# Patient Record
Sex: FEMALE | Race: WHITE | NOT HISPANIC OR LATINO | Employment: UNEMPLOYED | URBAN - METROPOLITAN AREA
[De-identification: names, ages, dates, MRNs, and addresses within clinical notes are randomized per-mention and may not be internally consistent; named-entity substitution may affect disease eponyms.]

---

## 2019-09-09 ENCOUNTER — HOSPITAL ENCOUNTER (EMERGENCY)
Facility: HOSPITAL | Age: 44
Discharge: HOME | End: 2019-09-10
Attending: EMERGENCY MEDICINE
Payer: MEDICAID

## 2019-09-09 VITALS
TEMPERATURE: 98 F | OXYGEN SATURATION: 100 % | HEART RATE: 68 BPM | DIASTOLIC BLOOD PRESSURE: 78 MMHG | SYSTOLIC BLOOD PRESSURE: 153 MMHG | BODY MASS INDEX: 26.29 KG/M2 | WEIGHT: 139.25 LBS | HEIGHT: 61 IN | RESPIRATION RATE: 19 BRPM

## 2019-09-09 DIAGNOSIS — L03.221 CELLULITIS OF NECK: Primary | ICD-10-CM

## 2019-09-09 LAB
ALBUMIN SERPL BCP-MCNC: 3.5 G/DL (ref 3.5–5.2)
ALP SERPL-CCNC: 89 U/L (ref 55–135)
ALT SERPL W/O P-5'-P-CCNC: 61 U/L (ref 10–44)
ANION GAP SERPL CALC-SCNC: 10 MMOL/L (ref 8–16)
AST SERPL-CCNC: 66 U/L (ref 10–40)
B-HCG UR QL: NEGATIVE
BASOPHILS # BLD AUTO: 0.02 K/UL (ref 0–0.2)
BASOPHILS NFR BLD: 0.2 % (ref 0–1.9)
BILIRUB SERPL-MCNC: 0.4 MG/DL (ref 0.1–1)
BILIRUB UR QL STRIP: NEGATIVE
BUN SERPL-MCNC: 10 MG/DL (ref 6–20)
CALCIUM SERPL-MCNC: 8.6 MG/DL (ref 8.7–10.5)
CHLORIDE SERPL-SCNC: 110 MMOL/L (ref 95–110)
CLARITY UR: CLEAR
CO2 SERPL-SCNC: 22 MMOL/L (ref 23–29)
COLOR UR: YELLOW
CREAT SERPL-MCNC: 0.8 MG/DL (ref 0.5–1.4)
DIFFERENTIAL METHOD: NORMAL
EOSINOPHIL # BLD AUTO: 0.1 K/UL (ref 0–0.5)
EOSINOPHIL NFR BLD: 1.4 % (ref 0–8)
ERYTHROCYTE [DISTWIDTH] IN BLOOD BY AUTOMATED COUNT: 13.2 % (ref 11.5–14.5)
EST. GFR  (AFRICAN AMERICAN): >60 ML/MIN/1.73 M^2
EST. GFR  (NON AFRICAN AMERICAN): >60 ML/MIN/1.73 M^2
GLUCOSE SERPL-MCNC: 83 MG/DL (ref 70–110)
GLUCOSE UR QL STRIP: NEGATIVE
HCT VFR BLD AUTO: 38.6 % (ref 37–48.5)
HGB BLD-MCNC: 13 G/DL (ref 12–16)
HGB UR QL STRIP: ABNORMAL
KETONES UR QL STRIP: NEGATIVE
LEUKOCYTE ESTERASE UR QL STRIP: NEGATIVE
LYMPHOCYTES # BLD AUTO: 2.3 K/UL (ref 1–4.8)
LYMPHOCYTES NFR BLD: 22.6 % (ref 18–48)
MCH RBC QN AUTO: 30.4 PG (ref 27–31)
MCHC RBC AUTO-ENTMCNC: 33.7 G/DL (ref 32–36)
MCV RBC AUTO: 90 FL (ref 82–98)
MONOCYTES # BLD AUTO: 0.7 K/UL (ref 0.3–1)
MONOCYTES NFR BLD: 7.2 % (ref 4–15)
NEUTROPHILS # BLD AUTO: 6.9 K/UL (ref 1.8–7.7)
NEUTROPHILS NFR BLD: 68.7 % (ref 38–73)
NITRITE UR QL STRIP: NEGATIVE
PH UR STRIP: 6 [PH] (ref 5–8)
PLATELET # BLD AUTO: 323 K/UL (ref 150–350)
PMV BLD AUTO: 10.1 FL (ref 9.2–12.9)
POTASSIUM SERPL-SCNC: 4.2 MMOL/L (ref 3.5–5.1)
PROT SERPL-MCNC: 6.8 G/DL (ref 6–8.4)
PROT UR QL STRIP: NEGATIVE
RBC # BLD AUTO: 4.27 M/UL (ref 4–5.4)
SODIUM SERPL-SCNC: 142 MMOL/L (ref 136–145)
SP GR UR STRIP: 1.02 (ref 1–1.03)
URN SPEC COLLECT METH UR: ABNORMAL
UROBILINOGEN UR STRIP-ACNC: NEGATIVE EU/DL
WBC # BLD AUTO: 10.12 K/UL (ref 3.9–12.7)

## 2019-09-09 PROCEDURE — 80053 COMPREHEN METABOLIC PANEL: CPT

## 2019-09-09 PROCEDURE — 87040 BLOOD CULTURE FOR BACTERIA: CPT

## 2019-09-09 PROCEDURE — 85025 COMPLETE CBC W/AUTO DIFF WBC: CPT

## 2019-09-09 PROCEDURE — 25000003 PHARM REV CODE 250: Performed by: EMERGENCY MEDICINE

## 2019-09-09 PROCEDURE — 81003 URINALYSIS AUTO W/O SCOPE: CPT

## 2019-09-09 PROCEDURE — 63600175 PHARM REV CODE 636 W HCPCS: Performed by: EMERGENCY MEDICINE

## 2019-09-09 PROCEDURE — 96374 THER/PROPH/DIAG INJ IV PUSH: CPT | Mod: 59

## 2019-09-09 PROCEDURE — 25500020 PHARM REV CODE 255: Performed by: EMERGENCY MEDICINE

## 2019-09-09 PROCEDURE — 99285 EMERGENCY DEPT VISIT HI MDM: CPT | Mod: 25

## 2019-09-09 PROCEDURE — 81025 URINE PREGNANCY TEST: CPT

## 2019-09-09 RX ORDER — LISINOPRIL 10 MG/1
10 TABLET ORAL
COMMUNITY
Start: 2018-10-11

## 2019-09-09 RX ORDER — RIFAMPIN 300 MG/1
300 CAPSULE ORAL EVERY 12 HOURS
Qty: 14 CAPSULE | Refills: 0 | Status: SHIPPED | OUTPATIENT
Start: 2019-09-09 | End: 2019-09-16

## 2019-09-09 RX ORDER — RIZATRIPTAN BENZOATE 10 MG/1
TABLET, ORALLY DISINTEGRATING ORAL
COMMUNITY
Start: 2018-11-01

## 2019-09-09 RX ORDER — HYDROCODONE BITARTRATE AND ACETAMINOPHEN 5; 325 MG/1; MG/1
1 TABLET ORAL EVERY 6 HOURS PRN
Qty: 9 TABLET | Refills: 0 | Status: SHIPPED | OUTPATIENT
Start: 2019-09-09

## 2019-09-09 RX ORDER — PANTOPRAZOLE SODIUM 40 MG/1
TABLET, DELAYED RELEASE ORAL
COMMUNITY
Start: 2017-11-02

## 2019-09-09 RX ORDER — LORAZEPAM 1 MG/1
TABLET ORAL
COMMUNITY

## 2019-09-09 RX ORDER — KETOROLAC TROMETHAMINE 30 MG/ML
30 INJECTION, SOLUTION INTRAMUSCULAR; INTRAVENOUS
Status: COMPLETED | OUTPATIENT
Start: 2019-09-09 | End: 2019-09-09

## 2019-09-09 RX ORDER — VANCOMYCIN HCL IN 5 % DEXTROSE 1G/250ML
1000 PLASTIC BAG, INJECTION (ML) INTRAVENOUS
Status: DISCONTINUED | OUTPATIENT
Start: 2019-09-09 | End: 2019-09-09

## 2019-09-09 RX ORDER — LIDOCAINE HYDROCHLORIDE 10 MG/ML
10 INJECTION, SOLUTION EPIDURAL; INFILTRATION; INTRACAUDAL; PERINEURAL
Status: COMPLETED | OUTPATIENT
Start: 2019-09-09 | End: 2019-09-09

## 2019-09-09 RX ADMIN — KETOROLAC TROMETHAMINE 30 MG: 30 INJECTION, SOLUTION INTRAMUSCULAR at 09:09

## 2019-09-09 RX ADMIN — IOHEXOL 75 ML: 350 INJECTION, SOLUTION INTRAVENOUS at 08:09

## 2019-09-09 RX ADMIN — LIDOCAINE HYDROCHLORIDE 100 MG: 10 INJECTION, SOLUTION EPIDURAL; INFILTRATION; INTRACAUDAL; PERINEURAL at 09:09

## 2019-09-09 NOTE — ED PROVIDER NOTES
"43 year old female that [facility] on abscess to neck      Pt understands that a workup will begin in the treatment lounge/results waiting areas due to there being no available beds. Pt also undertstands they will be placed in the next available bed where they will be seen and dispositioned by a physician. I am removing myself from the care of pt. Pt will be assigned to next available physician.      Derrek Swanpiyush 1837      SCRIBE #1 NOTE: I, Karthik Harrington, am scribing for, and in the presence of, Jose Quinones Jr., MD. I have scribed the entire note.       History     Chief Complaint   Patient presents with    Abscess     Pt states, "I have an abscess on my face." [facility] went to [facility] and was referred to the ER.     Review of patient's allergies indicates:   Allergen Reactions    Pcn [penicillins] Anaphylaxis and Hives    Bactrim [sulfamethoxazole-trimethoprim]     Betadine [povidone-iodine]     Keflex [cephalexin]     Sulfa (sulfonamide antibiotics)          History of Present Illness     HPI    9/9/2019, 9:13 PM  History obtained from the patient      History of Present Illness: Pattie Cherry is a 43 y.o. female patient who presents to the Emergency Department for evaluation of a draining abscess to the right neck s/p I&D which onset gradually several days ago. Pt states that the abscess was lanced at the [facility] on 9/6 and was referred from urgent care today for further evaluation. Symptoms are constant and moderate in severity. No mitigating or exacerbating factors reported. No associated sxs reported. Patient denies any increased warmth/erythema to the neck, fever, chills, n/v/d, abd pain, SOB, voice change, trouble swallowing, and all other sxs at this time. Prior Tx includes clindamycin without relief. No further complaints or concerns at this time.         Arrival mode: Personal vehicle    PCP: Jason Vo MD        Past Medical " History:  Past Medical History:   Diagnosis Date    Bipolar 1 disorder     Diabetes mellitus     Disc disorder of cervical region     Disc disorder of lumbar region     GERD (gastroesophageal reflux disease)     Hepatitis C     Hiatal hernia     IBS (irritable bowel syndrome)     Knee pain, chronic     Liver disease     Narcolepsy        Past Surgical History:  Past Surgical History:   Procedure Laterality Date    BREAST SURGERY       SECTION, CLASSIC      x3    KNEE SURGERY      TONSILLECTOMY, ADENOIDECTOMY           Family History:  Family History   Problem Relation Age of Onset    Hypertension Unknown        Social History:  Social History     Tobacco Use    Smoking status: Current Every Day Smoker     Packs/day: 0.50    Smokeless tobacco: Never Used   Substance and Sexual Activity    Alcohol use: No    Drug use: No     Comment: former IV drug user    Sexual activity: Not Currently        Review of Systems     Review of Systems   Constitutional: Negative for chills and fever.   HENT: Negative for sore throat, trouble swallowing and voice change.    Respiratory: Negative for shortness of breath.    Cardiovascular: Negative for chest pain.   Gastrointestinal: Negative for abdominal pain, diarrhea, nausea and vomiting.   Genitourinary: Negative for dysuria.   Musculoskeletal: Negative for back pain.   Skin: Positive for wound (abscess, right neck, draining). Negative for rash.        - increased warmth/erythema to the neck   Neurological: Negative for weakness.   Hematological: Does not bruise/bleed easily.   All other systems reviewed and are negative.       Physical Exam     Initial Vitals [19 1833]   BP Pulse Resp Temp SpO2   (!) 151/81 86 20 98 °F (36.7 °C) 100 %      MAP       --          Physical Exam  Nursing Notes and Vital Signs Reviewed.  Constitutional: Patient is in no acute distress. Well-developed and well-nourished.  Head: Atraumatic. Normocephalic.  Eyes: PERRL. EOM  "intact. Conjunctivae are not pale. No scleral icterus.  ENT: Mucous membranes are moist. Oropharynx is clear and symmetric.    Neck: Supple. Full ROM. No lymphadenopathy.  Cardiovascular: Regular rate. Regular rhythm. No murmurs, rubs, or gallops. Distal pulses are 2+ and symmetric.  Pulmonary/Chest: No respiratory distress. Clear to auscultation bilaterally. No wheezing or rales.  Abdominal: Soft and non-distended.  There is no tenderness.  No rebound, guarding, or rigidity. Good bowel sounds.  Genitourinary: No CVA tenderness  Musculoskeletal: Moves all extremities. No obvious deformities. No edema. No calf tenderness.  Skin: Warm and dry. There is an abscess to the right neck just below the angle of the mandible.  There is in open sinus with some induration.  There is no fluctuance.  This is superficial with mild overlying erythema.  No current drainage. Is no airway compromise.  Trachea is midline. No stridor.  Neurological:  Alert, awake, and appropriate.  Normal speech.  No acute focal neurological deficits are appreciated.  Psychiatric: Normal affect. Good eye contact. Appropriate in content.     ED Course   Procedures  ED Vital Signs:  Vitals:    09/09/19 1833 09/09/19 2106 09/09/19 2130 09/09/19 2330   BP: (!) 151/81 (!) 137/104 135/61 (!) 153/78   Pulse: 86 81 78 68   Resp: 20 17 20 19   Temp: 98 °F (36.7 °C)  98.1 °F (36.7 °C)    TempSrc: Oral  Oral    SpO2: 100% 100% 100% 100%   Weight: 63.2 kg (139 lb 3.5 oz)      Height: 5' 1" (1.549 m)          Abnormal Lab Results:  Labs Reviewed   COMPREHENSIVE METABOLIC PANEL - Abnormal; Notable for the following components:       Result Value    CO2 22 (*)     Calcium 8.6 (*)     AST 66 (*)     ALT 61 (*)     All other components within normal limits   URINALYSIS, REFLEX TO URINE CULTURE - Abnormal; Notable for the following components:    Occult Blood UA Trace (*)     All other components within normal limits    Narrative:     Preferred Collection Type->Urine, " Clean Catch   CULTURE, BLOOD   CULTURE, BLOOD   CBC W/ AUTO DIFFERENTIAL   PREGNANCY TEST, URINE RAPID        All Lab Results:  Results for orders placed or performed during the hospital encounter of 09/09/19   CBC auto differential   Result Value Ref Range    WBC 10.12 3.90 - 12.70 K/uL    RBC 4.27 4.00 - 5.40 M/uL    Hemoglobin 13.0 12.0 - 16.0 g/dL    Hematocrit 38.6 37.0 - 48.5 %    Mean Corpuscular Volume 90 82 - 98 fL    Mean Corpuscular Hemoglobin 30.4 27.0 - 31.0 pg    Mean Corpuscular Hemoglobin Conc 33.7 32.0 - 36.0 g/dL    RDW 13.2 11.5 - 14.5 %    Platelets 323 150 - 350 K/uL    MPV 10.1 9.2 - 12.9 fL    Gran # (ANC) 6.9 1.8 - 7.7 K/uL    Lymph # 2.3 1.0 - 4.8 K/uL    Mono # 0.7 0.3 - 1.0 K/uL    Eos # 0.1 0.0 - 0.5 K/uL    Baso # 0.02 0.00 - 0.20 K/uL    Gran% 68.7 38.0 - 73.0 %    Lymph% 22.6 18.0 - 48.0 %    Mono% 7.2 4.0 - 15.0 %    Eosinophil% 1.4 0.0 - 8.0 %    Basophil% 0.2 0.0 - 1.9 %    Differential Method Automated    Comprehensive metabolic panel   Result Value Ref Range    Sodium 142 136 - 145 mmol/L    Potassium 4.2 3.5 - 5.1 mmol/L    Chloride 110 95 - 110 mmol/L    CO2 22 (L) 23 - 29 mmol/L    Glucose 83 70 - 110 mg/dL    BUN, Bld 10 6 - 20 mg/dL    Creatinine 0.8 0.5 - 1.4 mg/dL    Calcium 8.6 (L) 8.7 - 10.5 mg/dL    Total Protein 6.8 6.0 - 8.4 g/dL    Albumin 3.5 3.5 - 5.2 g/dL    Total Bilirubin 0.4 0.1 - 1.0 mg/dL    Alkaline Phosphatase 89 55 - 135 U/L    AST 66 (H) 10 - 40 U/L    ALT 61 (H) 10 - 44 U/L    Anion Gap 10 8 - 16 mmol/L    eGFR if African American >60 >60 mL/min/1.73 m^2    eGFR if non African American >60 >60 mL/min/1.73 m^2   Rapid Pregnancy, Urine   Result Value Ref Range    Preg Test, Ur Negative    Urinalysis, Reflex to Urine Culture Urine, Clean Catch   Result Value Ref Range    Specimen UA Urine, Clean Catch     Color, UA Yellow Yellow, Straw, Janneth    Appearance, UA Clear Clear    pH, UA 6.0 5.0 - 8.0    Specific Gravity, UA 1.020 1.005 - 1.030    Protein, UA  Negative Negative    Glucose, UA Negative Negative    Ketones, UA Negative Negative    Bilirubin (UA) Negative Negative    Occult Blood UA Trace (A) Negative    Nitrite, UA Negative Negative    Urobilinogen, UA Negative <2.0 EU/dL    Leukocytes, UA Negative Negative         Imaging Results:  Imaging Results          CT Soft Tissue Neck With Contrast (Final result)  Result time 09/09/19 21:06:21    Final result by Luis E Melo MD (09/09/19 21:06:21)                 Impression:      Focal skin defect right lateral mid face.  Adjacent skin thickening and infiltration of the subcutaneous fat.  Poorly circumscribed 8 mm collection.  Probable cellulitis with early phlegmon.  No discrete or drainable fluid collection/abscess seen at this time.      Electronically signed by: Luis E Melo MD  Date:    09/09/2019  Time:    21:06             Narrative:    EXAMINATION:  CT SOFT TISSUE NECK WITH CONTRAST    CLINICAL HISTORY:  Neck abscess.    TECHNIQUE:  CT scan of the neck soft tissues performed with 75 cc Omnipaque 350. all CT scans at this facility are performed  using dose modulation techniques as appropriate to performed exam including the following:  automated exposure control; adjustment of mA and/or kV according to the patients size (this includes techniques or standardized protocols for targeted exams where dose is matched to indication/reason for exam: i.e. extremities or head);  iterative reconstruction technique.    COMPARISON:  None    FINDINGS:  There is a focal skin defect in the lateral right mid face just lateral to the right mandibular angle measuring up to 1.1 cm in size.  There is adjacent skin thickening.  Adjacent infiltration of the subcutaneous fat is present.  Thickening of the subjacent platysma muscle is noted as well.    In addition there is a poorly circumscribed collection involving the skin approximately 8 mm in size.  Possible early phlegmon.  A discrete drainable collection or  abscess is not seen at this time.    The parotid gland appears normal.  The submandibular gland appears normal.    No significant lymphadenopathy is seen.    The vascular structures are normal in configuration.    There are postoperative changes of the cervical spine                                        The Emergency Provider reviewed the vital signs and test results, which are outlined above.     ED Discussion       9:30 PM: Reassessed pt at this time.  Pt states her condition has improved at this time. Discussed with pt all pertinent ED information and results. Discussed pt dx and plan of tx. Gave pt all f/u and return to the ED instructions. All questions and concerns were addressed at this time. Pt expresses understanding of information and instructions, and is comfortable with plan to discharge. Pt is stable for discharge.    I discussed with patient and/or family/caretaker that evaluation in the ED does not suggest any emergent or life threatening medical conditions requiring immediate intervention beyond what was provided in the ED, and I believe patient is safe for discharge.  Regardless, an unremarkable evaluation in the ED does not preclude the development or presence of a serious of life threatening condition. As such, patient was instructed to return immediately for any worsening or change in current symptoms.    I discussed wound care precautions with patient and/or family/caretaker; specifically that all wounds have risk of infection despite efforts to cleanse and debride the wound; and there is a risk of an occult foreign body (and thus increased risk of infection) despite a negative examination.  I discussed with patient need to return for any signs of infection, specifically redness, increased pain, fever, drainage of pus, or any concern, immediately.    I counseled the patient on the risks of taking antibiotics, including taking all doses as prescribed. I explained the risk of antibiotic  resistance in the future and susceptibility to C. diff infection, severe allergic reactions, and yeast infections.     9:44 PM  The neck wounds are open and draining.  There is no fluid collection on CT scan but simply a phlegmon.  The patient is currently on clindamycin and is not allergic unlike her initial triage allergies.  In light of this improvement, there is nothing the drain at this point.  I will add rifampin for 2nd antibiotic and treat her pain for close follow-up with primary care doctor.  There is no fasciitis or subcu air.  The wounds are open and draining.  I have advised warm compresses 4 times daily.  This should heal in a few days.  Patient verbalized understanding agreement with all instructions and seems reliable.       Medical Decision Making:   Clinical Tests:   Lab Tests: Ordered and Reviewed  Radiological Study: Reviewed and Ordered           ED Medication(s):  Medications   lidocaine (PF) 10 mg/ml (1%) injection 100 mg (100 mg Infiltration Given 9/9/19 2102)   ketorolac injection 30 mg (30 mg Intravenous Given 9/9/19 2102)   iohexol (OMNIPAQUE 350) injection 100 mL (75 mLs Intravenous Given 9/9/19 2057)       New Prescriptions    HYDROCODONE-ACETAMINOPHEN (NORCO) 5-325 MG PER TABLET    Take 1 tablet by mouth every 6 (six) hours as needed for Pain.    RIFAMPIN (RIFADIN) 300 MG CAPSULE    Take 1 capsule (300 mg total) by mouth every 12 (twelve) hours. for 7 days        Medication List      START taking these medications    HYDROcodone-acetaminophen 5-325 mg per tablet  Commonly known as:  NORCO  Take 1 tablet by mouth every 6 (six) hours as needed for Pain.     rifAMpin 300 MG capsule  Commonly known as:  RIFADIN  Take 1 capsule (300 mg total) by mouth every 12 (twelve) hours. for 7 days        ASK your doctor about these medications    ATIVAN 1 MG tablet  Generic drug:  LORazepam     busPIRone 15 MG tablet  Commonly known as:  BUSPAR     furosemide 20 MG tablet  Commonly known as:  LASIX      gabapentin 600 MG tablet  Commonly known as:  NEURONTIN     hydrOXYzine HCl 25 MG tablet  Commonly known as:  ATARAX  Take 1 tablet (25 mg total) by mouth every 6 (six) hours.     lisinopril 10 MG tablet     methocarbamol 750 MG Tab  Commonly known as:  ROBAXIN  Take 1 tablet (750 mg total) by mouth 2 (two) times daily as needed (pain and muscle soreness).     paroxetine 30 MG tablet  Commonly known as:  PAXIL     PROTONIX 40 MG tablet  Generic drug:  pantoprazole     rizatriptan 10 MG disintegrating tablet  Commonly known as:  MAXALT-MLT     rOPINIRole 1 MG tablet  Commonly known as:  REQUIP     traMADol 50 mg tablet  Commonly known as:  ULTRAM           Where to Get Your Medications      You can get these medications from any pharmacy    Bring a paper prescription for each of these medications  · HYDROcodone-acetaminophen 5-325 mg per tablet  · rifAMpin 300 MG capsule         Follow-up Information     Jason Vo MD In 1 day.    Specialty:  Nuclear Medicine  Contact information:  [address] [place] [address]  [phone]                       Scribe Attestation:   Scribe #1: I performed the above scribed service and the documentation accurately describes the services I performed. I attest to the accuracy of the note.     Attending:   Physician Attestation Statement for Scribe #1: I, Jose Quinones Jr., MD, personally performed the services described in this documentation, as scribed by Karthik Harrington, in my presence, and it is both accurate and complete.           Clinical Impression       ICD-10-CM ICD-9-CM   1. Cellulitis of neck L03.221 682.1       Disposition:   Disposition: Discharged  Condition: Stable         Jose Quinones Jr., MD  09/09/19 1195       Jose Quinones Jr., MD  09/09/19 1761

## 2019-09-10 NOTE — ED NOTES
Unable to obtain IV access. Pt refusing to be stuck again. MD informed and advised to d/c antibiotic and discharge pt home.

## 2019-09-10 NOTE — DISCHARGE INSTRUCTIONS
Her neckWounds are draining.  There is no current drainable collection of fluid on her CT scan.  You have what is called a phlegmon.  Use warm compresses to the neck 4 times daily.  Continue clindamycin as previous flee prescribed in add rifampin.  Use Norco for breakthrough pain and ibuprofen for basic pain.  Follow up with her doctor tomorrow for re-evaluation.  Return as needed for any worsening symptoms, problems, questions or concerns.

## 2019-09-15 LAB
BACTERIA BLD CULT: NORMAL
BACTERIA BLD CULT: NORMAL

## 2019-09-17 ENCOUNTER — DOCUMENTATION ONLY (OUTPATIENT)
Dept: EMERGENCY MEDICINE | Facility: HOSPITAL | Age: 44
End: 2019-09-17

## 2019-09-17 ENCOUNTER — TELEPHONE (OUTPATIENT)
Dept: EMERGENCY MEDICINE | Facility: HOSPITAL | Age: 44
End: 2019-09-17

## 2019-09-17 NOTE — TELEPHONE ENCOUNTER
SPOKE WITH PATIENT CONCERNING F/U ON HCV TREATMENT/CARE.  STATES THAT SHE WOULD LIKE TREATMENT AND WILL SPEAK TO DR DUNLAP AT HER APPOINTMENT NEXT WEEK, ALLERGIC TO INTERFERON.

## 2019-09-17 NOTE — PROGRESS NOTES
F/U CALL WITH PATIENT TO ENSURE NO BARRIERS TO CARE/TREATMENT FOR HCV.  STATES SHE WAS ALLERGIC TO INTERFERON, HAS HAD HEALTH ISSUES BUT WILL ADDRESS WITH DR DUNLAP AT NEXT WEEKS APPOINTMENT.  SON POSITIVE AS WELL AND HAS NO INSURANCE.  PROVIDED WITH NUMBER TO [employer].